# Patient Record
(demographics unavailable — no encounter records)

---

## 2024-11-19 NOTE — PHYSICAL EXAM
[de-identified] : On physical examination the patient is NAD, NCAT. HEENT- WNL. Neurologically intact. The lungs are clear and the heart has a regular rate and rhythm. The abdomen is soft, without tenderness or pulsatile mass. Bilateral femoral pulses are palpable.

## 2024-11-19 NOTE — PHYSICAL EXAM
[de-identified] : On physical examination the patient is NAD, NCAT. HEENT- WNL. Neurologically intact. The lungs are clear and the heart has a regular rate and rhythm. The abdomen is soft, without tenderness or pulsatile mass. Bilateral femoral pulses are palpable.

## 2024-11-19 NOTE — HISTORY OF PRESENT ILLNESS
[FreeTextEntry1] : I have just had the pleasure of seeing Mrs. Bijal Swenson ( 43) in follow up for right lower extremity atherosclerosis with claudication.\par  \par  Mrs. Swenson is a pleasant 78-year-old lady who is status post right lower extremity angiogram and SFA stent placement for incapacitating claudication, performed on 18. She was doing well, however her symptoms recurred and she was found to have in-stent stenosis and on 19 she underwent right lower extremity angiography with angioplasty of stent. Most recently, we again performed PTA with DCB of SFA in-stent restenosis (2020). \par  \par  Since her last visit, Mrs. Swenson is doing well. She denies any symptoms of claudication, rest pain or tissue loss. She is taking Aspirin and Plavix and Lovastatin as instructed. Plavix was held for a week prior to urologic procedure for nephrolithiasis on 8/3/22. She continued to take Aspirin and resumed Plavix following lithotripsy. She is scheduled to undergo additional urologic intervention in the future. \par  \par  Her medical and surgical history is significant for scleroderma with Raynauds, HTN, HLD, cataracts surgery and vertigo\par  \par  Medications: Plavix, Aspirin, Norvasc, Diclofenac, Irbesartan-Hydrochlorothiazide, Lovastatin, and Meclizine\par  \par  Allergies: Lisinopril\par  \par  Social history: Former smoker (2 packs/week x 40 yrs)\par  \par  FH: Mother- breast cancer\par   [de-identified] : 9/11/23: Mrs. Swenson returns for surveillance DRU. She underwent dental procedures without complication. She has resumed use on antiplatelet therapy. She denies any rest pain or tissue loss. She complains of knee pain and has been advised knee surgery with orthopedics but she is deferring this at this time. She complains of joint pain and fatigue in bilateral legs. She is otherwise without complaints.   4/8/24: Mrs. Swenson returns for surveillance DRU. She reports chronic claudication symptoms in the legs and knee joint pain. She denies rest pain or tissue loss. She is taking DAPT and Lovastatin.   5/13/24: Mrs. Swenson returns to follow up s/p RLE angiogram performed on 4/24/24. There was high grade stenosis of proximal SFA, above the stent, which I angioplastied. There is single vessel AT runoff with small vessel disease in the foot. She denies any access site complications. She denies any lower extremity complaints. She is taking her medications as instructed. She reports a clicking noise that occurs periodically when she moves her neck (following up with PCP for OA evaluation).  11/18/24: Mrs. Swenson returns for surveillance DRU. Prior angiogram showed single vessel AT runoff with small vessel disease in the foot. Angioplasty of the SFA was performed. She continues to take DAPT and statin (Lovastatin). She reports bilateral leg claudication symptoms. She denies any rest pain or wounds.

## 2024-11-19 NOTE — ASSESSMENT
[FreeTextEntry1] : In summary, Mrs. Swenson presents with a history of RLE PAD s/p endovascular revascularization. DRU today are 0.79 from 1.10 right and 0.74 from 0.93 left. I instructed her to continue DAPT and statin therapy and to begin an exercise regimen with 30minutes of aerobic activity daily. She will follow up with repeat DRU and right ADO in three months. She will contact me if she develops any rest pain or wounds.

## 2024-11-19 NOTE — HISTORY OF PRESENT ILLNESS
[FreeTextEntry1] : I have just had the pleasure of seeing Mrs. Bijal Swenson ( 43) in follow up for right lower extremity atherosclerosis with claudication.\par  \par  Mrs. Swenson is a pleasant 78-year-old lady who is status post right lower extremity angiogram and SFA stent placement for incapacitating claudication, performed on 18. She was doing well, however her symptoms recurred and she was found to have in-stent stenosis and on 19 she underwent right lower extremity angiography with angioplasty of stent. Most recently, we again performed PTA with DCB of SFA in-stent restenosis (2020). \par  \par  Since her last visit, Mrs. Swenson is doing well. She denies any symptoms of claudication, rest pain or tissue loss. She is taking Aspirin and Plavix and Lovastatin as instructed. Plavix was held for a week prior to urologic procedure for nephrolithiasis on 8/3/22. She continued to take Aspirin and resumed Plavix following lithotripsy. She is scheduled to undergo additional urologic intervention in the future. \par  \par  Her medical and surgical history is significant for scleroderma with Raynauds, HTN, HLD, cataracts surgery and vertigo\par  \par  Medications: Plavix, Aspirin, Norvasc, Diclofenac, Irbesartan-Hydrochlorothiazide, Lovastatin, and Meclizine\par  \par  Allergies: Lisinopril\par  \par  Social history: Former smoker (2 packs/week x 40 yrs)\par  \par  FH: Mother- breast cancer\par   [de-identified] : 9/11/23: Mrs. Swenson returns for surveillance DRU. She underwent dental procedures without complication. She has resumed use on antiplatelet therapy. She denies any rest pain or tissue loss. She complains of knee pain and has been advised knee surgery with orthopedics but she is deferring this at this time. She complains of joint pain and fatigue in bilateral legs. She is otherwise without complaints.   4/8/24: Mrs. Swenson returns for surveillance DRU. She reports chronic claudication symptoms in the legs and knee joint pain. She denies rest pain or tissue loss. She is taking DAPT and Lovastatin.   5/13/24: Mrs. Swenson returns to follow up s/p RLE angiogram performed on 4/24/24. There was high grade stenosis of proximal SFA, above the stent, which I angioplastied. There is single vessel AT runoff with small vessel disease in the foot. She denies any access site complications. She denies any lower extremity complaints. She is taking her medications as instructed. She reports a clicking noise that occurs periodically when she moves her neck (following up with PCP for OA evaluation).  11/18/24: Mrs. Swenson returns for surveillance DRU. Prior angiogram showed single vessel AT runoff with small vessel disease in the foot. Angioplasty of the SFA was performed. She continues to take DAPT and statin (Lovastatin). She reports bilateral leg claudication symptoms. She denies any rest pain or wounds.

## 2024-12-03 NOTE — DISCUSSION/SUMMARY
[de-identified] : Patient was seen today for reevaluation of chronic intermittent right shoulder pain with recent atraumatic exacerbation due to underlying subacromial impingement and chronic rotator cuff tendinopathy.  We discussed various treatment options as well as associated risk/benefits/alternatives and patient elected to proceed with cortisone injection today (see procedure note).  Informed the patient that the numbing medicine in today's injection will last for about 4-6 hours. The steroid that was injected will start to work in 1 to 2 days, peak at 1-2 weeks, and may last up to 1-2 months.  Patient will be started on a course of physical therapy to restore normal range of motion and strength as tolerated.  Follow up as needed.  Patient appreciates and agrees with current plan.  This note was generated using dragon medical dictation software.  A reasonable effort has been made for proofreading its contents, but typos may still remain.  If there are any questions or points of clarification needed please notify my office.

## 2024-12-03 NOTE — PHYSICAL EXAM
[de-identified] : Constitutional: Well-nourished, well-developed, No acute distress Respiratory:  Good respiratory effort, no SOB Lymphatic: No regional lymphadenopathy, no lymphedema Psychiatric: Pleasant and normal affect, alert and oriented x3 Musculoskeletal: normal except where as noted in regional exam  Right shoulder: APPEARANCE: no marked deformities, no swelling or malalignment POSITIVE TENDERNESS: + Diffusely throughout the anterior/lateral/posterior shoulder, + anterior/posterior capsule NONTENDER: supraspinatus, infraspinatus, teres minor. biceps. AC joint.  ROM: Significantly limited in all planes active and passive motion  RESISTIVE TESTING: + pain, 4/5 resisted flex/ext, empty can/ER/IR, horizontal abd/add  SPECIAL TESTS: + apley's scratch test for limited motion and pain, + drop arm for pain without sig weakness, + empty can for pain without sig weakness

## 2024-12-03 NOTE — PROCEDURE
[de-identified] : Injection: Right shoulder Subacromial Space. Indication: Impingement.  A discussion was had with the patient regarding this procedure and all questions were answered. All risks, benefits and alternatives were discussed. These included but were not limited to bleeding, infection, and allergic reaction.  A timeout was done to ensure correct side and pt agreed to the procedure.   Alcohol was used to clean the skin, and betadine was used to sterilize and prep the area in the posterior aspect of the shoulder. Ethyl chloride spray was then used as a topical anesthetic. A 22-gauge 1.5" needle was used to inject 2cc of 0.25% bupivacaine and 1cc of 40mg/ml methylprednisolone into the subacromial space. A sterile bandage was then applied. The patient tolerated the procedure well and there were no complications.

## 2024-12-03 NOTE — HISTORY OF PRESENT ILLNESS
[de-identified] : 80 F w/ PMHx of HTN, preDM, hyperlipidemia, abnormal EKG/DENNEY/palpitations/fatigue, former smoker, overactive bladder, vertigo, constipation/hemorrhoids, intermittent claudication, adrenal adenoma, elevated SINGH/arthritis/knee S/p bilateral shoulder subacromial space cortisone injection 6/6/23 reports signficant relief in addition to the physical therapy Reports pain in the right shoulder for the past 2 weeks  Currently reports LROM has tried ibuprofen with minimal relief  Pain with movement of the shoulder  Denies any numbness, tingling or radiation of pain

## 2024-12-12 NOTE — ASSESSMENT
[FreeTextEntry1] : Limited SSc Generalized OA Lost to follow up since 2022  - labs as below. will call pt with results - conservative treatment of the patient's condition- including rest, ice, heat, anti-inflammatory medications, activity modifications, home stretching and strengthening exercises daily. - Life style modifications reviewed for RP. Maintain warm core body temperature. Educated about nonpharmacologic measures for treatment of Raynaud's. Hand/feet protection.  - repeat PFTS/echo. should get annually - encouraged compliance with follow up visits  Discussed treatment plan with the patient. The patient was given the opportunity to ask questions and all questions were answered to their satisfaction.

## 2024-12-12 NOTE — PHYSICAL EXAM
[General Appearance - Alert] : alert [General Appearance - In No Acute Distress] : in no acute distress [General Appearance - Well Nourished] : well nourished [General Appearance - Well Developed] : well developed [Sclera] : the sclera and conjunctiva were normal [Outer Ear] : the ears and nose were normal in appearance [Oropharynx] : the oropharynx was normal [Neck Appearance] : the appearance of the neck was normal [Neck Cervical Mass (___cm)] : no neck mass was observed [Jugular Venous Distention Increased] : there was no jugular-venous distention [Thyroid Diffuse Enlargement] : the thyroid was not enlarged [Thyroid Nodule] : there were no palpable thyroid nodules [Auscultation Breath Sounds / Voice Sounds] : lungs were clear to auscultation bilaterally [Heart Rate And Rhythm] : heart rate was normal and rhythm regular [Heart Sounds] : normal S1 and S2 [Heart Sounds Gallop] : no gallops [Murmurs] : no murmurs [Heart Sounds Pericardial Friction Rub] : no pericardial rub [Edema] : there was no peripheral edema [Bowel Sounds] : normal bowel sounds [Abdomen Soft] : soft [Abdomen Tenderness] : non-tender [No CVA Tenderness] : no ~M costovertebral angle tenderness [No Spinal Tenderness] : no spinal tenderness [Skin Color & Pigmentation] : normal skin color and pigmentation [Skin Turgor] : normal skin turgor [] : no rash [Skin Lesions] : no skin lesions [No Focal Deficits] : no focal deficits [Oriented To Time, Place, And Person] : oriented to person, place, and time [Impaired Insight] : insight and judgment were intact [Affect] : the affect was normal [Mood] : the mood was normal [Musculoskeletal - Swelling] : no joint swelling seen

## 2024-12-12 NOTE — HISTORY OF PRESENT ILLNESS
[FreeTextEntry1] : Pt presenting today for a f.u visit. Last seen 2022 and then lost to follow up.  Chart reviewed Currently reports feeling okay. Continues to experience intermittent arthralgias, fatigue. Has hx of OA. Takes Tylenol PRN for pain.  denies skin thickening, telangiectasias, rashes, calcinosis, difficulty swallowing, denies fever, chills, denies chest pain, chest palpitations, denies sob, denies nausea, vomiting, abdominal pain

## 2025-01-27 NOTE — HISTORY OF PRESENT ILLNESS
[de-identified] : 79 yo Patient with hx of SNHL w/ HA complains of bilateral ear clogging here for ear cleaning. Reports hearing worsened over the last year. Pt has no ear pain, ear drainage, tinnitus, vertigo, nasal congestion, nasal discharge, epistaxis, sinus infections, facial pain, facial pressure, throat pain, dysphagia or fevers  [FreeTextEntry1] : 3/21/2024 progressive HL w/ unclear hearing no other modifying factors no other nasal or throat complaints 9/12/2024 progressive HL w/ unclear hearing no other modifying factors no other nasal or throat complaints 1/27/2025 still c/o HL and words not clear no longer dizzy no other modifying factors no other nasal or throat complaints [Hearing Loss] : hearing loss [Presbycusis] : presbycusis [Loud Noise Exposure] : no history of loud noise exposure [Nasal Congestion] : no nasal congestion [Ear Fullness] : no ear fullness [Asthma] : no asthma [Environmental Allergens] : no environmental allergens [Adenoidectomy] : no adenoidectomy [Heat Intolerance] : no heat intolerance [Tobacco Use] : no tobacco use [Graves Disease] : no graves disease

## 2025-01-27 NOTE — DATA REVIEWED
[de-identified] : Hearing Test performed to evaluate the extent of hearing loss and  to explain pt's symptoms  was personally reviewed and revealed Tymps-wnl Hearing-bilat SNHL

## 2025-04-01 NOTE — DISCUSSION/SUMMARY
[EKG obtained to assist in diagnosis and management of assessed problem(s)] : EKG obtained to assist in diagnosis and management of assessed problem(s) [FreeTextEntry1] : 81F HTN, HLD, PVD with murmur, LE edema  LE edema: Mild DD vs PVD. ECG with PRWP, systolic murmur noted on exam. Will repeat TTE. Last 2021  HTN: BP better on repeat. LVH on prior TTE. Continue meds as dosed, monitor closely, Normal CMP  C/w amlodipine 7.5mg C/w irbesartan 300mg - HCTZ 12.5mg   HLD: Lipids borderline, . Acceptable for age. Continue lovastatin 10mg. Consider higher intensity statin  PAD: S/p LLE stent, under care of vascular. Maintained on clopidogrel, Asa, statin.    RV 6M

## 2025-04-01 NOTE — PHYSICAL EXAM
[Well Developed] : well developed [Well Nourished] : well nourished [No Acute Distress] : no acute distress [Normal Conjunctiva] : normal conjunctiva [Normal Venous Pressure] : normal venous pressure [No Carotid Bruit] : no carotid bruit [Normal S1, S2] : normal S1, S2 [No Rub] : no rub [No Gallop] : no gallop [Clear Lung Fields] : clear lung fields [Good Air Entry] : good air entry [No Respiratory Distress] : no respiratory distress  [Soft] : abdomen soft [Non Tender] : non-tender [No Masses/organomegaly] : no masses/organomegaly [Normal Bowel Sounds] : normal bowel sounds [Normal Gait] : normal gait [No Cyanosis] : no cyanosis [No Clubbing] : no clubbing [No Varicosities] : no varicosities [Edema ___] : edema [unfilled] [No Rash] : no rash [No Skin Lesions] : no skin lesions [Moves all extremities] : moves all extremities [No Focal Deficits] : no focal deficits [Normal Speech] : normal speech [Alert and Oriented] : alert and oriented [Normal memory] : normal memory [de-identified] : 2/6 systolic usb

## 2025-05-19 NOTE — ASSESSMENT
[FreeTextEntry1] : In summary, Mrs. Swenson presents with a history of RLE PAD s/p endovascular revascularization. DRU today are 0.8 right and 0.79 left. I instructed her to continue DAPT and statin therapy and to begin an exercise regimen with 30minutes of aerobic activity daily. She will follow up with Dr. Conrad with repeat DRU in six months.   Plavix may be held for 5 days prior to endoscopy and should be restarted as soon as possible post-procedure. She should continue Aspirin 81mg daily.

## 2025-05-19 NOTE — PHYSICAL EXAM
[de-identified] : On physical examination the patient is NAD, NCAT. HEENT- WNL. Neurologically intact. The lungs are clear and the heart has a regular rate and rhythm. The abdomen is soft, without tenderness or pulsatile mass. Bilateral femoral pulses are palpable.

## 2025-05-19 NOTE — HISTORY OF PRESENT ILLNESS
[FreeTextEntry1] : I have just had the pleasure of seeing Mrs. Bijal Swenson ( 43) in follow up for right lower extremity atherosclerosis with claudication.\par  \par  Mrs. Swenson is a pleasant 78-year-old lady who is status post right lower extremity angiogram and SFA stent placement for incapacitating claudication, performed on 18. She was doing well, however her symptoms recurred and she was found to have in-stent stenosis and on 19 she underwent right lower extremity angiography with angioplasty of stent. Most recently, we again performed PTA with DCB of SFA in-stent restenosis (2020). \par  \par  Since her last visit, Mrs. Swenson is doing well. She denies any symptoms of claudication, rest pain or tissue loss. She is taking Aspirin and Plavix and Lovastatin as instructed. Plavix was held for a week prior to urologic procedure for nephrolithiasis on 8/3/22. She continued to take Aspirin and resumed Plavix following lithotripsy. She is scheduled to undergo additional urologic intervention in the future. \par  \par  Her medical and surgical history is significant for scleroderma with Raynauds, HTN, HLD, cataracts surgery and vertigo\par  \par  Medications: Plavix, Aspirin, Norvasc, Diclofenac, Irbesartan-Hydrochlorothiazide, Lovastatin, and Meclizine\par  \par  Allergies: Lisinopril\par  \par  Social history: Former smoker (2 packs/week x 40 yrs)\par  \par  FH: Mother- breast cancer\par   [de-identified] : 9/11/23: Mrs. Swenson returns for surveillance DRU. She underwent dental procedures without complication. She has resumed use on antiplatelet therapy. She denies any rest pain or tissue loss. She complains of knee pain and has been advised knee surgery with orthopedics but she is deferring this at this time. She complains of joint pain and fatigue in bilateral legs. She is otherwise without complaints.   4/8/24: Mrs. Swenson returns for surveillance DRU. She reports chronic claudication symptoms in the legs and knee joint pain. She denies rest pain or tissue loss. She is taking DAPT and Lovastatin.   5/13/24: Mrs. Swenson returns to follow up s/p RLE angiogram performed on 4/24/24. There was high grade stenosis of proximal SFA, above the stent, which I angioplastied. There is single vessel AT runoff with small vessel disease in the foot. She denies any access site complications. She denies any lower extremity complaints. She is taking her medications as instructed. She reports a clicking noise that occurs periodically when she moves her neck (following up with PCP for OA evaluation).  11/18/24: Mrs. Swenson returns for surveillance DRU. Prior angiogram showed single vessel AT runoff with small vessel disease in the foot. Angioplasty of the SFA was performed. She continues to take DAPT and statin (Lovastatin). She reports bilateral leg claudication symptoms. She denies any rest pain or wounds.   5/19/25:  Mrs. Swenson returns for surveillance DRU. Prior angiogram showed single vessel AT runoff with small vessel disease in the foot. Angioplasty of the SFA was performed. She continues to take DAPT and statin (Lovastatin). She reports bilateral leg claudication symptoms (1 block) but she is able to ambulate. She denies any rest pain or wounds. She reports that she will be undergoing colonoscopy and Plavix will need to be held.